# Patient Record
Sex: MALE | Race: OTHER | HISPANIC OR LATINO | URBAN - METROPOLITAN AREA
[De-identification: names, ages, dates, MRNs, and addresses within clinical notes are randomized per-mention and may not be internally consistent; named-entity substitution may affect disease eponyms.]

---

## 2024-09-03 ENCOUNTER — EMERGENCY (EMERGENCY)
Facility: HOSPITAL | Age: 24
LOS: 0 days | Discharge: HOME | End: 2024-09-03
Attending: EMERGENCY MEDICINE
Payer: MEDICAID

## 2024-09-03 VITALS
WEIGHT: 112.44 LBS | TEMPERATURE: 99 F | HEIGHT: 65 IN | HEART RATE: 72 BPM | RESPIRATION RATE: 20 BRPM | DIASTOLIC BLOOD PRESSURE: 72 MMHG | SYSTOLIC BLOOD PRESSURE: 132 MMHG | OXYGEN SATURATION: 100 %

## 2024-09-03 DIAGNOSIS — Z23 ENCOUNTER FOR IMMUNIZATION: ICD-10-CM
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
## 2024-09-03 DIAGNOSIS — S01.01XA LACERATION WITHOUT FOREIGN BODY OF SCALP, INITIAL ENCOUNTER: ICD-10-CM
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
## 2024-09-03 DIAGNOSIS — M54.2 CERVICALGIA: ICD-10-CM

## 2024-09-03 PROCEDURE — 12002 RPR S/N/AX/GEN/TRNK2.6-7.5CM: CPT

## 2024-09-03 PROCEDURE — 99283 EMERGENCY DEPT VISIT LOW MDM: CPT | Mod: 25

## 2024-09-03 PROCEDURE — 90471 IMMUNIZATION ADMIN: CPT

## 2024-09-03 PROCEDURE — 90715 TDAP VACCINE 7 YRS/> IM: CPT

## 2024-09-03 PROCEDURE — 99284 EMERGENCY DEPT VISIT MOD MDM: CPT | Mod: 25

## 2024-09-03 RX ORDER — IBUPROFEN 600 MG
800 TABLET ORAL ONCE
Refills: 0 | Status: COMPLETED | OUTPATIENT
Start: 2024-09-03 | End: 2024-09-03

## 2024-09-03 RX ORDER — TETANUS TOXOID, REDUCED DIPHTHERIA TOXOID AND ACELLULAR PERTUSSIS VACCINE, ADSORBED 5; 2.5; 8; 8; 2.5 [IU]/.5ML; [IU]/.5ML; UG/.5ML; UG/.5ML; UG/.5ML
0.5 SUSPENSION INTRAMUSCULAR ONCE
Refills: 0 | Status: COMPLETED | OUTPATIENT
Start: 2024-09-03 | End: 2024-09-03

## 2024-09-03 RX ORDER — ACETAMINOPHEN 325 MG/1
650 TABLET ORAL ONCE
Refills: 0 | Status: COMPLETED | OUTPATIENT
Start: 2024-09-03 | End: 2024-09-03

## 2024-09-03 RX ADMIN — Medication 800 MILLIGRAM(S): at 14:04

## 2024-09-03 RX ADMIN — ACETAMINOPHEN 650 MILLIGRAM(S): 325 TABLET ORAL at 14:05

## 2024-09-03 RX ADMIN — TETANUS TOXOID, REDUCED DIPHTHERIA TOXOID AND ACELLULAR PERTUSSIS VACCINE, ADSORBED 0.5 MILLILITER(S): 5; 2.5; 8; 8; 2.5 SUSPENSION INTRAMUSCULAR at 14:05

## 2024-09-03 NOTE — ED ADULT NURSE NOTE - NSFALLUNIVINTERV_ED_ALL_ED
Bed/Stretcher in lowest position, wheels locked, appropriate side rails in place/Call bell, personal items and telephone in reach/Instruct patient to call for assistance before getting out of bed/chair/stretcher/Non-slip footwear applied when patient is off stretcher/[place] to call system/Physically safe environment - no spills, clutter or unnecessary equipment/Purposeful proactive rounding/Room/bathroom lighting operational, light cord in reach
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
## 2024-09-03 NOTE — ED PROVIDER NOTE - PATIENT PORTAL LINK FT
You can access the FollowMyHealth Patient Portal offered by [facility] by registering at the following website: [email or web address]. By joining [employer]’s FollowMyHealth portal, you will also be able to view your health information using other applications (apps) compatible with our system.
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
## 2024-09-03 NOTE — ED PROVIDER NOTE - NSFOLLOWUPINSTRUCTIONS_ED_ALL_ED_FT
Deberá regresar a urgencias en 10 días para que le retiren las grapas ().    Cuidado de un desgarro, en adultos  Laceration Care, Adult  Un desgarro es un jayda que puede atravesar todas las capas de la piel hasta el tejido que se encuentra debajo de la piel. Algunos desgarros cicatrizan por sí solos. Otros se deben cerrar con puntos (suturas), grapas, tiras adhesivas para la piel o goma para cerrar la piel.    El cuidado adecuado de un desgarro reduce el riesgo de infección, ayuda a que el desgarro cierre mejor, y puede prevenir la formación de cicatrices.    Consejos generales  Mantenga la herida limpia y seca.  No se rasque ni se toque la herida.  Lávese las richard con agua y jabón nikita al menos 20 segundos antes y después de tocar la herida o cambiarse la venda (vendaje). Use desinfectante para richard si no dispone de agua y jabón.  No usedesinfectantes ni antisépticos, amy alcohol para frotar, para limpiar la herida, a menos que se lo indique artis médico.  Si le colocaron un vendaje, cámbielo al menos maritza vez al día o amy se lo haya indicado el médico. También debe cambiarlo si se moja o se ensucia.  Cómo cuidar del desgarro  Si se utilizaron suturas o grapas:    Mantenga la herida completamente seca nikita las primeras 24 horas o amy se lo haya indicado el médico. Transcurrido braydon tiempo, puede ducharse o yogi callum de inmersión. No se sumerja en agua hasta que le hayan quitado las suturas o grapas.  Limpie la herida maritza vez por día o amy se lo haya indicado el médico. Para hacer esto:  Lave la herida con agua y jabón.  Enjuáguela con agua para quitar todo el jabón.  Séquela dando palmaditas con maritza toalla limpia. No frote la herida.  Después de limpiar la herida, aplique maritza delgada capa de ungüento con antibiótico, otros ungüentos tópicos, o un vendaje no adherente amy se lo haya indicado el médico. Amado ayudará a prevenir infecciones y a evitar que el vendaje se adhiera a la herida.  [place] que le retiren las suturas o las grapas amy se lo haya indicado el médico. No retire las suturas ni las grapas usted mismo.  Si se utilizaron tiras adhesivas para la piel:    No deje que las tiras adhesivas para la piel se mojen. Puede bañarse o ducharse, bettina tenga cuidado de mantener la herida seca.  Si se moja, séquela dando palmaditas con maritza toalla limpia. No frote la herida.  Las tiras adhesivas para la piel se caen solas. Si los bordes de las tiras adhesivas empiezan a despegarse y enroscarse, puede recortar los que estén sueltos. No retire las tiras adhesivas por completo a menos que el médico se lo indique.  Si se utilizó goma para cerrar la piel:    Puede bañarse o ducharse, bettina tenga cuidado de mantener la herida seca. No sumerja la herida en agua.  Después de ducharse o darse un baño, seque el jayda dando palmaditas con maritza toalla limpia. No frote la herida.  No practique actividades que lo william transpirar mucho hasta que la goma para cerrar la piel se haya caído castro.  No aplique líquidos, cremas ni ungüentos medicinales en la herida mientras todavía tenga la goma para cerrar la piel. De lo contrario, puede aflojar la película antes de que la herida cicatrice.  Si la herida está cubierta con un vendaje, no aplique cinta adhesiva directamente sobre la goma para cerrar la piel. De lo contrario, puede hacer que la goma se despegue antes de que la herida cicatrice.  No toque la goma. La goma para cerrar la piel generalmente permanece sobre la piel de 5 a 10 días y luego se  castro.  Siga estas instrucciones en artis casa:  Medicamentos    Use los medicamentos de venta cori y los recetados solamente amy se lo haya indicado el médico.  Si le recetaron un medicamento o ungüento con antibiótico, tómelo o aplíqueselo amy se lo haya indicado el médico. No deje de usarlo aunque la afección mejore.  Control del dolor y la hinchazón    Si se lo indican, aplique hielo sobre la leroy de la lesión. Para hacer esto:  Ponga el hielo en maritza bolsa plástica.  Coloque maritza toalla entre la piel y la bolsa.  Aplique el hielo nikita 20 minutos, 2 o 3 veces por día.  Retire el hielo si la piel se pone de color briseno brillante. Amado es muy importante. Si no puede sentir dolor, calor o frío, tiene un mayor riesgo de que se dañe la leroy.  Nikita las primeras 24 a 48 horas después de que le hayan reparado el desgarro, cuando esté sentado o acostado, levante (eleve) la leroy de la lesión por encima del nivel del corazón.  Instrucciones generales    Two wounds closed with skin glue. One is normal. The other is red with pus and infected.  Evite cualquier actividad que pueda hacer que el desgarro vuelva a abrirse.  Controle la herida todos los días para detectar signos de infección. Esté atento a lo siguiente:  Aumento del enrojecimiento, la hinchazón o el dolor.  Líquido o reid.  Calor.  Pus o mal olor.  Concurra a todas las visitas de seguimiento. Amado es importante.  Comuníquese con un médico si:  Le aplicaron la vacuna antitetánica y tiene hinchazón, dolor intenso, enrojecimiento o hemorragia en el sitio de la inyección.  La herida cerrada se abre.  Tiene cualquiera de estos signos de infección:  Más enrojecimiento, hinchazón o dolor alrededor de la herida.  Líquido o reid que salen de la herida.  Calor que proviene de la herida.  Pus o mal olor proveniente de la herida.  Fiebre.  Nota un cuerpo extraño en la herida, amy un trozo de perez o danay.  El dolor no se iram con los medicamentos.  Observa que la piel cerca de la herida cambia de color.  Necesita cambiar el vendaje con frecuencia.  Presenta maritza nueva erupción cutánea.  Tiene entumecimiento alrededor de la herida.  Solicite ayuda de inmediato si:  Tiene mucha hinchazón alrededor de la herida.  El dolor aumenta repentinamente y es intenso.  Presenta nódulos dolorosos cerca de la herida o en la piel en cualquier leroy del cuerpo.  Tiene maritza línea charles que sale de la herida.  La herida está en la mano o en el pie, y no puede  correctamente valerie de los dedos.  La herida está en la mano o en el pie y observa que los dedos tienen un reshma pálido o azulado.  Resumen  Un desgarro es un jayda que puede atravesar todas las capas de la piel hasta el tejido que se encuentra debajo de la piel.  Algunos desgarros cicatrizan por sí solos. Otros se deben cerrar con puntos (suturas), grapas, tiras adhesivas para la piel o goma para cerrar la piel.  El cuidado adecuado de un desgarro reduce el riesgo de infección, ayuda a que el desgarro cierre mejor, y puede prevenir la formación de cicatrices.

## 2024-09-03 NOTE — ED PROVIDER NOTE - PHYSICAL EXAMINATION
Vital Signs: I have reviewed the initial vital signs.  Constitutional: appears stated age, no acute distress  Head: 3cm laceration to anterior medial frontal scalp  Eyes: Sclera clear, EOMI.  Cardiovascular: S1 and S2, regular rate, regular rhythm, well-perfused extremities, radial pulses equal and 2+, pedal pulses 2+ and equal  Respiratory: unlabored respiratory effort, clear to auscultation bilaterally no wheezing, rales, or rhonchi  Musculoskeletal: supple neck, no lower extremity edema, no midline spinal tenderness  Integumentary: warm, dry, no rash  Neurologic: awake, alert, oriented x3, extremities’ motor and sensory functions grossly intact

## 2024-09-13 ENCOUNTER — EMERGENCY (EMERGENCY)
Facility: HOSPITAL | Age: 24
LOS: 0 days | Discharge: HOME | End: 2024-09-13
Attending: EMERGENCY MEDICINE
Payer: MEDICAID

## 2024-09-13 VITALS
HEIGHT: 65 IN | HEART RATE: 85 BPM | RESPIRATION RATE: 19 BRPM | SYSTOLIC BLOOD PRESSURE: 122 MMHG | OXYGEN SATURATION: 98 % | TEMPERATURE: 99 F | DIASTOLIC BLOOD PRESSURE: 76 MMHG

## 2024-09-13 DIAGNOSIS — M62.838 OTHER MUSCLE SPASM: ICD-10-CM

## 2024-09-13 DIAGNOSIS — Z48.02 ENCOUNTER FOR REMOVAL OF SUTURES: ICD-10-CM

## 2024-09-13 DIAGNOSIS — S01.01XD LACERATION WITHOUT FOREIGN BODY OF SCALP, SUBSEQUENT ENCOUNTER: ICD-10-CM

## 2024-09-13 PROCEDURE — 99284 EMERGENCY DEPT VISIT MOD MDM: CPT

## 2024-09-13 PROCEDURE — 99283 EMERGENCY DEPT VISIT LOW MDM: CPT

## 2024-09-13 RX ORDER — LIDOCAINE/BENZALKONIUM/ALCOHOL
1 SOLUTION, NON-ORAL TOPICAL
Qty: 1 | Refills: 0
Start: 2024-09-13 | End: 2024-09-17

## 2024-09-13 RX ORDER — KETOROLAC TROMETHAMINE 30 MG/ML
1 INJECTION, SOLUTION INTRAMUSCULAR
Qty: 20 | Refills: 0
Start: 2024-09-13 | End: 2024-09-17

## 2024-09-13 NOTE — ED PROVIDER NOTE - ATTENDING CONTRIBUTION TO CARE
Open  [id number] used.  Patient presents for staple removal.  Was here on 9 3 after head injury.  3 staples placed at that time.  Has been healing well.  No bleeding drainage.  No signs of infection.  Of note patient also reports some left-sided neck pain which she had at that time.  Run out of medication.  No numbness ting or weakness.  No new injuries.    CONSTITUTIONAL: Well-developed; well-nourished; in no acute distress.   SKIN: warm, dry  HEAD: Normocephalic; atraumatic.  Healing laceration to the frontal scalp.  No signs of infection.  No drainage or bleeding.  3 staples in place  EYES: PERRL, EOMI, no conjunctival erythema  ENT: No nasal discharge; airway clear.  NECK: Supple; no midline cervical tenderness.  Mild left paracervical tenderness.  EXT: Normal ROM.  5/5 strength in all 4 extremities   LYMPH: No acute cervical adenopathy.  NEURO: Alert, oriented, grossly unremarkable. neurovascularly intact

## 2024-09-13 NOTE — ED ADULT NURSE NOTE - CAS EDP DISCH TYPE
The patient has been re-examined and I agree with the above assessment or I updated with my findings. Home

## 2024-09-13 NOTE — ED PROVIDER NOTE - PATIENT PORTAL LINK FT
You can access the FollowMyHealth Patient Portal offered by [facility] by registering at the following website: [email or web address]. By joining [employer]’s FollowMyHealth portal, you will also be able to view your health information using other applications (apps) compatible with our system.

## 2024-09-13 NOTE — ED PROVIDER NOTE - PROGRESS NOTE DETAILS
Philip Michaels DO: Patient complaining of neck spasm. Prescribed lidocain patch and ketorolac for PRN pain.

## 2024-09-13 NOTE — ED PROVIDER NOTE - NSFOLLOWUPINSTRUCTIONS_ED_ALL_ED_FT
Se quitaron 3 grapas.    Regrese a la suleiman de emergencias si presenta fiebre, tos, cambios en la visión o si la herida del cuero cabelludo comienza a drenar pus.

## 2024-09-13 NOTE — ED PROVIDER NOTE - PHYSICAL EXAMINATION
Vitals: Reviewed, within normal limits.   General: NAD, sitting comfortably in a chair.   Head: 3cm well healed laceration, no pus, no edema, no active bleeding.   Eyes: PERRL  Cardio: RRR, no murmurs auscultated. Pedal and radial pulses 2+, equal. Cap refill <2.   Lungs: Good air movement throughout, no distress. CTAB.   Extremities: Full AROM. No cyanosis, edema, or rash noted.

## 2024-09-13 NOTE — ED PROVIDER NOTE - CLINICAL SUMMARY MEDICAL DECISION MAKING FREE TEXT BOX
Patient presents for staple removal.  3 staples removed without complication.  Well-healing.  Patient also having some left neck pain.  Lidocaine and Toradol given.  Patient discharged with PMD follow-up and return precautions.

## 2024-09-13 NOTE — ED ADULT NURSE NOTE - NSFALLUNIVINTERV_ED_ALL_ED
Bed/Stretcher in lowest position, wheels locked, appropriate side rails in place/Call bell, personal items and telephone in reach/Instruct patient to call for assistance before getting out of bed/chair/stretcher/Non-slip footwear applied when patient is off stretcher/[place] to call system/Physically safe environment - no spills, clutter or unnecessary equipment/Purposeful proactive rounding/Room/bathroom lighting operational, light cord in reach
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
## 2024-09-13 NOTE — ED PROVIDER NOTE - OBJECTIVE STATEMENT
23-year-old male with no significant past medical history presents to ER for staple removal.  Patient presented on 9/3 with a laceration from his work when someone dropped something metal on him.  Laceration is 3 cm along the medial scalp.  It has been 10 days since staple placement.  There are 3 staples that need to be removed.